# Patient Record
(demographics unavailable — no encounter records)

---

## 2019-03-22 NOTE — RAD
3 VIEWS LEFT FOOT:

 

Date:  03/22/19 

 

COMPARISON:  

None. 

 

HISTORY:  

Injury to left foot after dropping a heavy glass dish on the foot. The glass dish broke. 

 

FINDINGS:

Three views of the left foot show no evidence of acute fracture or dislocation. No radiopaque foreign
 body is seen. Mild soft tissue swelling is seen. 

 

IMPRESSION: 

No evidence of acute osseous abnormality. 

 

 

POS: [facility]